# Patient Record
Sex: FEMALE | Race: WHITE | NOT HISPANIC OR LATINO
[De-identification: names, ages, dates, MRNs, and addresses within clinical notes are randomized per-mention and may not be internally consistent; named-entity substitution may affect disease eponyms.]

---

## 2022-10-12 ENCOUNTER — P2P PATIENT RECORD (OUTPATIENT)
Age: 72
End: 2022-10-12

## 2023-01-09 ENCOUNTER — OFFICE VISIT (OUTPATIENT)
Dept: RURAL CLINIC 2 | Facility: CLINIC | Age: 73
End: 2023-01-09

## 2023-01-20 ENCOUNTER — OFFICE VISIT (OUTPATIENT)
Dept: RURAL CLINIC 2 | Facility: CLINIC | Age: 73
End: 2023-01-20
Payer: COMMERCIAL

## 2023-01-20 ENCOUNTER — LAB OUTSIDE AN ENCOUNTER (OUTPATIENT)
Dept: RURAL CLINIC 2 | Facility: CLINIC | Age: 73
End: 2023-01-20

## 2023-01-20 ENCOUNTER — CLAIMS CREATED FROM THE CLAIM WINDOW (OUTPATIENT)
Dept: RURAL CLINIC 2 | Facility: CLINIC | Age: 73
End: 2023-01-20
Payer: COMMERCIAL

## 2023-01-20 VITALS
HEART RATE: 90 BPM | SYSTOLIC BLOOD PRESSURE: 132 MMHG | WEIGHT: 157 LBS | TEMPERATURE: 97.3 F | DIASTOLIC BLOOD PRESSURE: 93 MMHG | BODY MASS INDEX: 27.82 KG/M2 | HEIGHT: 63 IN

## 2023-01-20 DIAGNOSIS — K21.9 GASTROESOPHAGEAL REFLUX DISEASE, UNSPECIFIED WHETHER ESOPHAGITIS PRESENT: ICD-10-CM

## 2023-01-20 DIAGNOSIS — K57.32 CECAL DIVERTICULITIS: ICD-10-CM

## 2023-01-20 PROBLEM — 162031009: Status: ACTIVE | Noted: 2023-01-20

## 2023-01-20 PROCEDURE — 99203 OFFICE O/P NEW LOW 30 MIN: CPT | Performed by: NURSE PRACTITIONER

## 2023-01-20 RX ORDER — FAMOTIDINE 20 MG/1
1 TABLET AT BEDTIME AS NEEDED TABLET, FILM COATED ORAL ONCE A DAY
Status: ACTIVE | COMMUNITY

## 2023-01-20 RX ORDER — SODIUM, POTASSIUM,MAG SULFATES 17.5-3.13G
177ML SOLUTION, RECONSTITUTED, ORAL ORAL
Qty: 1 | Refills: 0 | OUTPATIENT
Start: 2023-01-20 | End: 2023-01-22

## 2023-01-20 RX ORDER — IBANDRONATE SODIUM 150 MG/1
1 TABLET 60 MINUTES BEFORE THE FIRST FOOD, BEVERAGE OR MEDICINE OF THE DAY WITH PLAIN WATER TABLET, FILM COATED ORAL
Status: ACTIVE | COMMUNITY

## 2023-01-20 NOTE — HPI-TODAY'S VISIT:
The patient is a 72-year-old female who presents on referral from Dr. Marah Fry for diverticulitis and GERD.  A copy of this document to be sent to the referring provider.  The patient was treated for diverticulitis in November 2022.  She was seen in the emergency room at [facility] at that time where she underwent a CT scan of the abdomen pelvis revealing uncomplicated diverticulitis.  She was treated with antibiotics with Cipro and Flagyl with resolution of her symptoms.  She is currently asymptomatic and denies abdominal pain, change in bowel pattern, melena and hematochezia.  Her last colonoscopy was completed in 2019 and she denies a history of colon polyps.  Past surgical history of a colon resection in 1996 secondary to diverticular perforation per her own words.  Over the past several weeks she has been having breakthrough acid reflux with epigastric abdominal pain.  She is currently taking famotidine 20 mg daily and is helping.  Past medical history is negative for cardiopulmonary abnormalities.

## 2023-01-26 PROBLEM — 235595009: Status: ACTIVE | Noted: 2023-01-20

## 2023-01-26 PROBLEM — 307496006: Status: ACTIVE | Noted: 2023-01-20

## 2023-01-30 ENCOUNTER — TELEPHONE ENCOUNTER (OUTPATIENT)
Dept: URBAN - METROPOLITAN AREA CLINIC 92 | Facility: CLINIC | Age: 73
End: 2023-01-30

## 2023-01-30 ENCOUNTER — TELEPHONE ENCOUNTER (OUTPATIENT)
Dept: RURAL CLINIC 2 | Facility: CLINIC | Age: 73
End: 2023-01-30

## 2023-02-06 ENCOUNTER — OFFICE VISIT (OUTPATIENT)
Dept: RURAL CLINIC 2 | Facility: CLINIC | Age: 73
End: 2023-02-06

## 2023-02-10 ENCOUNTER — OFFICE VISIT (OUTPATIENT)
Dept: RURAL CLINIC 2 | Facility: CLINIC | Age: 73
End: 2023-02-10

## 2023-03-23 ENCOUNTER — OFFICE VISIT (OUTPATIENT)
Dept: RURAL MEDICAL CENTER 4 | Facility: MEDICAL CENTER | Age: 73
End: 2023-03-23
Payer: COMMERCIAL

## 2023-03-23 DIAGNOSIS — D12.2 ADENOMA OF ASCENDING COLON: ICD-10-CM

## 2023-03-23 DIAGNOSIS — K29.60 ADENOPAPILLOMATOSIS GASTRICA: ICD-10-CM

## 2023-03-23 DIAGNOSIS — K22.89 DILATATION OF ESOPHAGUS: ICD-10-CM

## 2023-03-23 DIAGNOSIS — Z87.19 ESOPHAGEAL FOOD BOLUS: ICD-10-CM

## 2023-03-23 DIAGNOSIS — K31.7 BENIGN GASTRIC POLYP: ICD-10-CM

## 2023-03-23 PROCEDURE — 43239 EGD BIOPSY SINGLE/MULTIPLE: CPT | Performed by: INTERNAL MEDICINE

## 2023-03-23 PROCEDURE — 45385 COLONOSCOPY W/LESION REMOVAL: CPT | Performed by: INTERNAL MEDICINE

## 2024-02-07 ENCOUNTER — OV EP (OUTPATIENT)
Dept: RURAL CLINIC 8 | Facility: CLINIC | Age: 74
End: 2024-02-07
Payer: COMMERCIAL

## 2024-02-07 VITALS
SYSTOLIC BLOOD PRESSURE: 143 MMHG | HEIGHT: 63 IN | BODY MASS INDEX: 26.75 KG/M2 | WEIGHT: 151 LBS | HEART RATE: 83 BPM | TEMPERATURE: 97.1 F | DIASTOLIC BLOOD PRESSURE: 83 MMHG

## 2024-02-07 DIAGNOSIS — Z98.890 HISTORY OF COLON SURGERY: ICD-10-CM

## 2024-02-07 DIAGNOSIS — K31.7 GASTRIC POLYPS: ICD-10-CM

## 2024-02-07 DIAGNOSIS — K21.9 CHRONIC GERD: ICD-10-CM

## 2024-02-07 DIAGNOSIS — Z86.010 HISTORY OF COLON POLYPS: ICD-10-CM

## 2024-02-07 DIAGNOSIS — R93.3 ABNORMAL ESOPHAGRAM: ICD-10-CM

## 2024-02-07 DIAGNOSIS — K57.90 DIVERTICULOSIS: ICD-10-CM

## 2024-02-07 PROBLEM — 397881000: Status: ACTIVE | Noted: 2024-02-07

## 2024-02-07 PROBLEM — 235595009: Status: ACTIVE | Noted: 2024-02-07

## 2024-02-07 PROBLEM — 428283002: Status: ACTIVE | Noted: 2024-02-07

## 2024-02-07 PROBLEM — 78809005: Status: ACTIVE | Noted: 2024-02-07

## 2024-02-07 PROCEDURE — 99213 OFFICE O/P EST LOW 20 MIN: CPT | Performed by: INTERNAL MEDICINE

## 2024-02-07 RX ORDER — OMEPRAZOLE 20 MG/1
1 CAPSULTE CAPSULE, DELAYED RELEASE ORAL ONCE A DAY
Qty: 90 CAPSULE | Refills: 3 | OUTPATIENT
Start: 2024-02-07

## 2024-02-07 RX ORDER — IBANDRONATE SODIUM 150 MG/1
1 TABLET 60 MINUTES BEFORE THE FIRST FOOD, BEVERAGE OR MEDICINE OF THE DAY WITH PLAIN WATER TABLET, FILM COATED ORAL
Status: ACTIVE | COMMUNITY

## 2024-02-07 RX ORDER — FAMOTIDINE 20 MG/1
1 TABLET AT BEDTIME AS NEEDED TABLET, FILM COATED ORAL ONCE A DAY
Status: ACTIVE | COMMUNITY

## 2024-02-07 NOTE — HPI-TODAY'S VISIT:
The patient is a 72-year-old female who presents on referral from Dr. Marah Fry for diverticulitis and GERD.  A copy of this document to be sent to the referring provider.  The patient was treated for diverticulitis in November 2022.  She was seen in the emergency room at [facility] at that time where she underwent a CT scan of the abdomen pelvis revealing uncomplicated diverticulitis.  She was treated with antibiotics with Cipro and Flagyl with resolution of her symptoms.  She is currently asymptomatic and denies abdominal pain, change in bowel pattern, melena and hematochezia.  Her last colonoscopy was completed in 2019 and she denies a history of colon polyps.  Past surgical history of a colon resection in 1996 secondary to diverticular perforation per her own words.  Over the past several weeks she has been having breakthrough acid reflux with epigastric abdominal pain.  She is currently taking famotidine 20 mg daily and is helping.  Past medical history is negative for cardiopulmonary abnormalities. - 2/7/2024: i did egd/colonoscopy in 3/2023. he had panediverticulosis and a single adenoma. a CC anastomosis in the left colon. EGD was significant only for some gastric polyps. She says that about a month ago she developed chest pains. she had EKG that was okay per report. she had a barium swallow. 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

## 2024-05-10 ENCOUNTER — OFFICE VISIT (OUTPATIENT)
Dept: RURAL CLINIC 8 | Facility: CLINIC | Age: 74
End: 2024-05-10

## 2024-05-10 RX ORDER — FAMOTIDINE 20 MG/1
1 TABLET AT BEDTIME AS NEEDED TABLET, FILM COATED ORAL ONCE A DAY
Status: ACTIVE | COMMUNITY

## 2024-05-10 RX ORDER — OMEPRAZOLE 20 MG/1
1 CAPSULTE CAPSULE, DELAYED RELEASE ORAL ONCE A DAY
Qty: 90 CAPSULE | Refills: 3 | Status: ACTIVE | COMMUNITY
Start: 2024-02-07

## 2024-05-10 RX ORDER — IBANDRONATE SODIUM 150 MG/1
1 TABLET 60 MINUTES BEFORE THE FIRST FOOD, BEVERAGE OR MEDICINE OF THE DAY WITH PLAIN WATER TABLET, FILM COATED ORAL
Status: ACTIVE | COMMUNITY

## 2025-03-17 ENCOUNTER — WEB ENCOUNTER (OUTPATIENT)
Dept: RURAL CLINIC 2 | Facility: CLINIC | Age: 75
End: 2025-03-17